# Patient Record
Sex: MALE | Race: WHITE
[De-identification: names, ages, dates, MRNs, and addresses within clinical notes are randomized per-mention and may not be internally consistent; named-entity substitution may affect disease eponyms.]

---

## 2018-01-24 ENCOUNTER — HOSPITAL ENCOUNTER (INPATIENT)
Dept: HOSPITAL 50 - VM.ED | Age: 83
LOS: 5 days | Discharge: HOME HEALTH | DRG: 871 | End: 2018-01-29
Attending: FAMILY MEDICINE | Admitting: FAMILY MEDICINE
Payer: MEDICARE

## 2018-01-24 DIAGNOSIS — Z87.891: ICD-10-CM

## 2018-01-24 DIAGNOSIS — M19.90: ICD-10-CM

## 2018-01-24 DIAGNOSIS — Z79.899: ICD-10-CM

## 2018-01-24 DIAGNOSIS — R09.02: ICD-10-CM

## 2018-01-24 DIAGNOSIS — H52.10: ICD-10-CM

## 2018-01-24 DIAGNOSIS — G89.29: ICD-10-CM

## 2018-01-24 DIAGNOSIS — F39: ICD-10-CM

## 2018-01-24 DIAGNOSIS — I71.4: ICD-10-CM

## 2018-01-24 DIAGNOSIS — B95.61: ICD-10-CM

## 2018-01-24 DIAGNOSIS — M54.9: ICD-10-CM

## 2018-01-24 DIAGNOSIS — R50.9: ICD-10-CM

## 2018-01-24 DIAGNOSIS — Z79.52: ICD-10-CM

## 2018-01-24 DIAGNOSIS — R05: ICD-10-CM

## 2018-01-24 DIAGNOSIS — I73.9: ICD-10-CM

## 2018-01-24 DIAGNOSIS — L02.211: ICD-10-CM

## 2018-01-24 DIAGNOSIS — Z79.82: ICD-10-CM

## 2018-01-24 DIAGNOSIS — A41.9: Primary | ICD-10-CM

## 2018-01-24 DIAGNOSIS — K43.2: ICD-10-CM

## 2018-01-24 DIAGNOSIS — H40.9: ICD-10-CM

## 2018-01-24 DIAGNOSIS — K43.9: ICD-10-CM

## 2018-01-24 DIAGNOSIS — E86.0: ICD-10-CM

## 2018-01-24 DIAGNOSIS — R65.20: ICD-10-CM

## 2018-01-24 DIAGNOSIS — R79.89: ICD-10-CM

## 2018-01-24 DIAGNOSIS — J44.1: ICD-10-CM

## 2018-01-24 DIAGNOSIS — N17.9: ICD-10-CM

## 2018-01-24 DIAGNOSIS — E78.5: ICD-10-CM

## 2018-01-24 DIAGNOSIS — Z85.51: ICD-10-CM

## 2018-01-24 DIAGNOSIS — Z85.118: ICD-10-CM

## 2018-01-24 DIAGNOSIS — J18.8: ICD-10-CM

## 2018-01-24 DIAGNOSIS — T81.4XXA: ICD-10-CM

## 2018-01-24 DIAGNOSIS — R06.02: ICD-10-CM

## 2018-01-24 LAB
CHLORIDE SERPL-SCNC: 96 MMOL/L (ref 98–107)
SODIUM SERPL-SCNC: 136 MMOL/L (ref 136–145)

## 2018-01-24 PROCEDURE — 94760 N-INVAS EAR/PLS OXIMETRY 1: CPT

## 2018-01-24 PROCEDURE — 83880 ASSAY OF NATRIURETIC PEPTIDE: CPT

## 2018-01-24 PROCEDURE — 87205 SMEAR GRAM STAIN: CPT

## 2018-01-24 PROCEDURE — 87070 CULTURE OTHR SPECIMN AEROBIC: CPT

## 2018-01-24 PROCEDURE — 83605 ASSAY OF LACTIC ACID: CPT

## 2018-01-24 PROCEDURE — 71045 X-RAY EXAM CHEST 1 VIEW: CPT

## 2018-01-24 PROCEDURE — 96375 TX/PRO/DX INJ NEW DRUG ADDON: CPT

## 2018-01-24 PROCEDURE — 87804 INFLUENZA ASSAY W/OPTIC: CPT

## 2018-01-24 PROCEDURE — 85025 COMPLETE CBC W/AUTO DIFF WBC: CPT

## 2018-01-24 PROCEDURE — 84484 ASSAY OF TROPONIN QUANT: CPT

## 2018-01-24 PROCEDURE — 74177 CT ABD & PELVIS W/CONTRAST: CPT

## 2018-01-24 PROCEDURE — 96365 THER/PROPH/DIAG IV INF INIT: CPT

## 2018-01-24 PROCEDURE — 86140 C-REACTIVE PROTEIN: CPT

## 2018-01-24 PROCEDURE — 85610 PROTHROMBIN TIME: CPT

## 2018-01-24 PROCEDURE — 87040 BLOOD CULTURE FOR BACTERIA: CPT

## 2018-01-24 PROCEDURE — 94640 AIRWAY INHALATION TREATMENT: CPT

## 2018-01-24 PROCEDURE — 99285 EMERGENCY DEPT VISIT HI MDM: CPT

## 2018-01-24 PROCEDURE — 36415 COLL VENOUS BLD VENIPUNCTURE: CPT

## 2018-01-24 PROCEDURE — 80053 COMPREHEN METABOLIC PANEL: CPT

## 2018-01-24 PROCEDURE — 83735 ASSAY OF MAGNESIUM: CPT

## 2018-01-24 PROCEDURE — 96361 HYDRATE IV INFUSION ADD-ON: CPT

## 2018-01-24 PROCEDURE — 93005 ELECTROCARDIOGRAM TRACING: CPT

## 2018-01-24 RX ADMIN — Medication PRN ML: at 20:13

## 2018-01-24 RX ADMIN — SODIUM CHLORIDE AND POTASSIUM CHLORIDE SCH MLS/HR: .9; .15 SOLUTION INTRAVENOUS at 18:45

## 2018-01-25 LAB
CHLORIDE SERPL-SCNC: 102 MMOL/L (ref 98–107)
SODIUM SERPL-SCNC: 137 MMOL/L (ref 136–145)

## 2018-01-25 RX ADMIN — TOLTERODINE TARTRATE SCH MG: 2 CAPSULE, EXTENDED RELEASE ORAL at 19:48

## 2018-01-25 RX ADMIN — TRIAMTERENE AND HYDROCHLOROTHIAZIDE SCH EACH: 37.5; 25 TABLET ORAL at 12:17

## 2018-01-25 RX ADMIN — SODIUM CHLORIDE AND POTASSIUM CHLORIDE SCH MLS/HR: .9; .15 SOLUTION INTRAVENOUS at 05:41

## 2018-01-25 RX ADMIN — SODIUM CHLORIDE AND POTASSIUM CHLORIDE SCH MLS/HR: .9; .15 SOLUTION INTRAVENOUS at 16:46

## 2018-01-25 NOTE — EDM.PDOC
ED HPI GENERAL MEDICAL PROBLEM





- General


Chief Complaint: Respiratory Problem


Time Seen by Provider: 01/24/18 14:05


Source of Information: Reports: Patient


History Limitations: Reports: No Limitations





- History of Present Illness


INITIAL COMMENTS - FREE TEXT/NARRATIVE: 





Pt. presents to the ER with cough, chest congestion, and shortness of breath. 

He has been experiencing fever and chills. Pt. states that he has been sick of 

over a week. 


He complains of myalgias and arthralgias as well. No nausea/vomiting/or 

diarrhea.


Location: Reports: Generalized


Associated Symptoms: Reports: Cough, cough w sputum, Shortness of Breath, 

Weakness





- Related Data


 Allergies











Allergy/AdvReac Type Severity Reaction Status Date / Time


 


No Known Allergies Allergy   Verified 01/24/18 15:20











Home Meds: 


 Home Meds





Ammonium Lactate [Lac-Hydrin 12% Crm] 1 applic TOP QID PRN 12/14/15 [History]


Triamcinolone Acetonide [Kenalog 0.1% Crm] 1 applic TOP BID 12/14/15 [History]


Triamterene/Hydrochlorothiazid [Triamterene-HCTZ 37.5-25 MG] 1 cap PO DAILY 12/

14/15 [History]


amLODIPine [Norvasc] 10 mg PO BEDTIME 12/14/15 [History]


Aspirin [Halfprin] 81 mg PO DAILY  tab.ec 12/17/15 [Rx]


Metoprolol Tartrate [Lopressor] 100 mg PO DAILY  tablet 12/17/15 [Rx]


Pantoprazole [ProTONIX***] 40 mg PO DAILY@0700  tab.cr 12/17/15 [Rx]


Tolterodine [Detrol LA 24 Hr] 4 mg PO BEDTIME #30 cap.er 12/17/15 [Rx]


Captopril [Capoten] 25 mg PO BIDMEALS 06/21/16 [History]


atorvaSTATin [Lipitor] 40 mg PO BEDTIME 06/21/16 [History]


DULoxetine [Cymbalta] 20 mg PO DAILY 01/24/18 [History]


Ibuprofen [Advil] 600 mg PO BEDTIME 01/24/18 [History]


Meloxicam [Mobic] 7.5 mg PO DAILY 01/24/18 [History]


Polyethylene Glycol 3350 [MiraLAX] 17 gm PO DAILY PRN 01/24/18 [History]


traMADol [Ultram] 50 - 100 mg PO Q6H PRN 01/24/18 [History]











Past Medical History


HEENT History: Reports: Cataract, Glaucoma, Impaired Vision


Other HEENT History: myopia


Cardiovascular History: Reports: Aneurysm, High Cholesterol, Hypertension, PVD


Other Cardiovascular History: arthrosclerosis, aflutter


Respiratory History: Reports: SOB


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


Gastrointestinal History: Reports: None


Other Gastrointestinal History: abdominal pain


Genitourinary History: Reports: Other (See Below)


Other Genitourinary History: urine cytology abnormalm, bladder tumor, hematuria

, neoplasm of bladder and prostate


Musculoskeletal History: Reports: Back Pain, Chronic, Osteoarthritis


Neurological History: Reports: None


Endocrine/Metabolic History: Reports: None


Dermatologic History: Reports: Other (See Below)


Other Dermatologic History: tinea corporis





- Infectious Disease History


Infectious Disease History: Reports: Chicken Pox, Measles, Mumps





- Past Surgical History


HEENT Surgical History: Reports: Cataract Surgery


Musculoskeletal Surgical History: Reports: Hip Replacement





Social & Family History





- Family History


Family Medical History: Noncontributory


Cardiac: Reports: MI


Neurological: Reports: Alzheimers Disease





- Tobacco Use


Smoking Status *Q: Current Every Day Smoker


Years of Tobacco use: 63


Packs/Tins Daily: 0.5


Second Hand Smoke Exposure: No





- Alcohol Use


Days Per Week of Alcohol Use: 7


Number of Drinks Per Day: 2


Total Drinks Per Week: 14





- Recreational Drug Use


Recreational Drug Use: No





ED ROS GENERAL





- Review of Systems


Review Of Systems: See Below


Constitutional: Reports: Fever, Chills, Malaise, Weakness, Fatigue


HEENT: Reports: Rhinitis, Sinus Problem


Respiratory: Reports: No Symptoms, Shortness of Breath, Pleuritic Chest Pain, 

Cough, Sputum


Cardiovascular: Reports: No Symptoms, Dyspnea on Exertion


Endocrine: Reports: No Symptoms


GI/Abdominal: Reports: No Symptoms


: Reports: No Symptoms


Musculoskeletal: Reports: Joint Pain, Muscle Pain


Skin: Reports: No Symptoms


Neurological: Reports: No Symptoms


Psychiatric: Reports: No Symptoms


Hematologic/Lymphatic: Reports: No Symptoms


Immunologic: Reports: No Symptoms





ED EXAM, GENERAL





- Physical Exam


Exam: See Below


Exam Limited By: No Limitations


General Appearance: Alert, WD/WN, No Apparent Distress


Nose: Normal Inspection, Normal Mucosa, No Blood


Throat/Mouth: Normal Inspection, Normal Lips, Normal Teeth, Normal Gums, Normal 

Oropharynx, Normal Voice, No Airway Compromise


Head: Atraumatic, Normocephalic


Neck: Normal Inspection, Supple, Non-Tender, Full Range of Motion


Respiratory/Chest: Respiratory Distress, Decreased Breath Sounds, Crackles, 

Wheezing


Cardiovascular: Normal Peripheral Pulses, No Edema, Tachycardia


Peripheral Pulses: 2+: Radial (L), Radial (R)


GI/Abdominal: Normal Bowel Sounds, Soft, Non-Tender, Hernia


 (Male) Exam: Deferred


Rectal (Males) Exam: Deferred


Back Exam: Normal Inspection, Full Range of Motion


Extremities: Normal Inspection, Normal Range of Motion, Non-Tender, No Pedal 

Edema, Normal Capillary Refill


Neurological: Alert, Oriented, CN II-XII Intact, Normal Cognition, Normal Gait, 

Normal Reflexes, No Motor/Sensory Deficits


Psychiatric: Normal Affect, Normal Mood


Skin Exam: Warm, Dry, Intact, Normal Color, No Rash


Lymphatic: No Adenopathy





EKG INTERPRETATION


Rhythm: NSR





Course





- Vital Signs


Last Recorded V/S: 





 Last Vital Signs











Temp  36.4 C   01/25/18 14:00


 


Pulse  62   01/25/18 14:00


 


Resp  24 H  01/25/18 14:00


 


BP  123/73   01/25/18 14:00


 


Pulse Ox  98   01/25/18 14:00














- Orders/Labs/Meds


Orders: 





 Active Orders 24 hr











 Category Date Time Status


 


 AEROBIC IDENT [MREF] Stat Lab  01/25/18 14:42 Received








 Medication Orders





Amlodipine Besylate (Norvasc)  10 mg PO BEDTIME [facility]


Aspirin (Halfprin)  81 mg PO DAILY [facility]


   Last Admin: 01/25/18 09:06  Dose: 81 mg


Atorvastatin Calcium (Lipitor)  40 mg PO BEDTIME [facility]


Captopril (Capoten)  25 mg PO BIDMEALS [facility]


   Last Admin: 01/25/18 09:06  Dose: 25 mg


Ceftriaxone Sodium (Rocephin)  1 gm IVPUSH DAILY [facility]


   Last Admin: 01/25/18 09:05  Dose: 1 gm


   Admin: 01/24/18 20:13  Dose: 1 gm


Duloxetine HCl (Cymbalta)  20 mg PO DAILY [facility]


   Last Admin: 01/25/18 09:06  Dose: 20 mg


Potassium Chloride/Sodium Chloride (Normal Saline With 20 Meq Kcl)  1,000 mls @ 

100 mls/hr IV ASDIRECTED [facility]


   Last Admin: 01/25/18 16:46  Dose: 150 mls/hr


   Infusion: 01/25/18 12:22  Dose: 150 mls/hr


   Admin: 01/25/18 05:41  Dose: 150 mls/hr


   Infusion: 01/25/18 01:26  Dose: 150 mls/hr


   Admin: 01/24/18 18:45  Dose: 150 mls/hr


Azithromycin 500 mg/ Sodium (Chloride)  250 mls @ 250 mls/hr IV DAILY [facility]


Ibuprofen (Motrin)  600 mg PO BEDTIME [facility]


Meloxicam (Mobic)  7.5 mg PO DAILY [facility]


   Last Admin: 01/25/18 09:06  Dose: 7.5 mg


Metoprolol Tartrate (Lopressor)  100 mg PO DAILY [facility]


   Last Admin: 01/25/18 09:06  Dose: 100 mg


Ammonium Lactate [ Lac-Hydrin 12% Crm] Own Supply  1 dose TOP QID PRN


   PRN Reason: Itching


Pantoprazole Sodium (Protonix***)  40 mg PO DAILY@0700 [facility]


   Last Admin: 01/25/18 06:19  Dose: 40 mg


   Admin: 01/25/18 05:41  Dose: 40 mg


Polyethylene Glycol (Miralax)  17 gm PO DAILY PRN


   PRN Reason: Constipation


Sodium Chloride (Saline Flush)  10 ml FLUSH ASDIRECTED PRN


   PRN Reason: Keep Vein Open


   Last Admin: 01/24/18 20:13  Dose: 10 ml


Tolterodine Tartrate (Detrol La 24 Hr)  4 mg PO BEDTIME [facility]


Tramadol HCl (Ultram)  50 - 100 mg PO Q6H PRN


   PRN Reason: Pain


Triamterene/HCTZ (Maxzide 25-37.5 Mg)  1 each PO DAILY [facility]


   Last Admin: 01/25/18 12:17  Dose: 1 each








Labs: 





 Laboratory Tests











  01/24/18 01/24/18 01/24/18 Range/Units





  14:38 14:45 14:45 


 


WBC   11.1 H   (4.0-10.0)  x10^3/uL


 


RBC   4.43 L   (4.5-6.0)  x10^6/uL


 


Hgb   14.7  D   (14.0-18.0)  g/dL


 


Hct   43.3   (40.0-52.0)  %


 


MCV   97.7 H   (78.0-93.0)  fL


 


MCH   33.2 H   (26.0-32.0)  pg


 


MCHC   33.9   (32.0-36.0)  g/dL


 


RDW Coeff of Sudhir   14.2   (10.0-15.0)  %


 


Plt Count   185  D   (130-400)  x10^3/uL


 


Add Manual Diff   Yes   


 


Neutrophils % (Manual)   71   (50-80)  %


 


Band Neutrophils %   3   (0-6)  %


 


Lymphocytes % (Manual)   21 L   (25-50)  %


 


Monocytes % (Manual)   5   (2-11)  %


 


Platelet Estimate   Adequate   


 


PT  10.7    (9.8-11.8)  SEC


 


INR  1.0 L    (2.0-3.5)  


 


Sodium    136  (136-145)  mmol/L


 


Potassium    3.7  (3.5-5.1)  mmol/L


 


Chloride    96 L  ()  mmol/L


 


Carbon Dioxide    27  (21-32)  mmol/L


 


BUN    26 H  (7-18)  mg/dL


 


Creatinine    1.6 H  (0.70-1.30)  mg/dL


 


Est Cr Clr Drug Dosing    38.57  mL/min


 


Estimated GFR (MDRD)    42  


 


Glucose    129 H  ()  mg/dL


 


Lactic Acid     (0.4-2.0)  mmol/L


 


Calcium    9.0  (8.5-10.1)  mg/dL


 


Corrected Calcium    9.64  (8.5-10.1)  mg/dL


 


Magnesium    1.3 L  (1.8-2.4)  mg/dL


 


Total Bilirubin    0.8  (0.2-1.0)  mg/dL


 


AST    26  (15-37)  U/L


 


ALT    16  (16-63)  U/L


 


Alkaline Phosphatase    76  ()  U/L


 


Troponin I    < 0.017  (<=0.056)  ng/mL


 


C-Reactive Protein    < 0.2  (<=0.9)  mg/dL


 


NT-Pro-B Natriuret Pep    2667 H  (<=450)  pg/mL


 


Total Protein    8.1  (6.4-8.2)  g/dL


 


Albumin    3.2 L  (3.4-5.0)  g/dL


 


Globulin    4.9  


 


Albumin/Globulin Ratio    0.65  














  01/24/18 Range/Units





  14:45 


 


WBC   (4.0-10.0)  x10^3/uL


 


RBC   (4.5-6.0)  x10^6/uL


 


Hgb   (14.0-18.0)  g/dL


 


Hct   (40.0-52.0)  %


 


MCV   (78.0-93.0)  fL


 


MCH   (26.0-32.0)  pg


 


MCHC   (32.0-36.0)  g/dL


 


RDW Coeff of Sudhir   (10.0-15.0)  %


 


Plt Count   (130-400)  x10^3/uL


 


Add Manual Diff   


 


Neutrophils % (Manual)   (50-80)  %


 


Band Neutrophils %   (0-6)  %


 


Lymphocytes % (Manual)   (25-50)  %


 


Monocytes % (Manual)   (2-11)  %


 


Platelet Estimate   


 


PT   (9.8-11.8)  SEC


 


INR   (2.0-3.5)  


 


Sodium   (136-145)  mmol/L


 


Potassium   (3.5-5.1)  mmol/L


 


Chloride   ()  mmol/L


 


Carbon Dioxide   (21-32)  mmol/L


 


BUN   (7-18)  mg/dL


 


Creatinine   (0.70-1.30)  mg/dL


 


Est Cr Clr Drug Dosing   mL/min


 


Estimated GFR (MDRD)   


 


Glucose   ()  mg/dL


 


Lactic Acid  2.1 H*  (0.4-2.0)  mmol/L


 


Calcium   (8.5-10.1)  mg/dL


 


Corrected Calcium   (8.5-10.1)  mg/dL


 


Magnesium   (1.8-2.4)  mg/dL


 


Total Bilirubin   (0.2-1.0)  mg/dL


 


AST   (15-37)  U/L


 


ALT   (16-63)  U/L


 


Alkaline Phosphatase   ()  U/L


 


Troponin I   (<=0.056)  ng/mL


 


C-Reactive Protein   (<=0.9)  mg/dL


 


NT-Pro-B Natriuret Pep   (<=450)  pg/mL


 


Total Protein   (6.4-8.2)  g/dL


 


Albumin   (3.4-5.0)  g/dL


 


Globulin   


 


Albumin/Globulin Ratio   











Meds: 





Medications











Generic Name Dose Route Start Last Admin





  Trade Name Freq  PRN Reason Stop Dose Admin


 


Amlodipine Besylate  10 mg  01/25/18 20:00  





  Norvasc  PO   





  BEDTIME [facility]   


 


Aspirin  81 mg  01/25/18 08:00  01/25/18 09:06





  Halfprin  PO   81 mg





  DAILY [facility]   Administration


 


Atorvastatin Calcium  40 mg  01/25/18 20:00  





  Lipitor  PO   





  BEDTIME [facility]   


 


Captopril  25 mg  01/25/18 08:00  01/25/18 09:06





  Capoten  PO   25 mg





  BIDMEALS [facility]O   Administration


 


Ceftriaxone Sodium  1 gm  01/24/18 21:00  01/25/18 09:05





  Rocephin  IVPUSH   1 gm





  DAILY [facility]   Administration


 


Duloxetine HCl  20 mg  01/25/18 08:00  01/25/18 09:06





  Cymbalta  PO   20 mg





  DAILY [facility]   Administration


 


Potassium Chloride/Sodium Chloride  1,000 mls @ 100 mls/hr  01/24/18 18:30  01/ 25/18 16:46





  Normal Saline With 20 Meq Kcl  IV   150 mls/hr





  ASDIRECTED [facility]   Administration


 


Azithromycin 500 mg/ Sodium  250 mls @ 250 mls/hr  01/26/18 08:00  





  Chloride  IV   





  DAILY [facility]   


 


Ibuprofen  600 mg  01/25/18 20:00  





  Motrin  PO   





  BEDTIME [facility]   


 


Meloxicam  7.5 mg  01/25/18 08:00  01/25/18 09:06





  Mobic  PO   7.5 mg





  DAILY [facility]   Administration


 


Metoprolol Tartrate  100 mg  01/25/18 08:00  01/25/18 09:06





  Lopressor  PO   100 mg





  DAILY [facility]   Administration


 


Ammonium Lactate [  1 dose  01/25/18 08:50  





Lac-Hydrin 12% Crm]  TOP   





Own Supply  QID PRN   





  Itching   


 


Pantoprazole Sodium  40 mg  01/25/18 07:00  01/25/18 06:19





  Protonix***  PO   40 mg





  DAILY@0700 [facility]   Administration


 


Polyethylene Glycol  17 gm  01/25/18 08:50  





  Miralax  PO   





  DAILY PRN   





  Constipation   


 


Sodium Chloride  10 ml  01/24/18 14:10  01/24/18 20:13





  Saline Flush  FLUSH   10 ml





  ASDIRECTED PRN   Administration





  Keep Vein Open   


 


Tolterodine Tartrate  4 mg  01/25/18 20:00  





  Detrol La 24 Hr  PO   





  BEDTIME [facility]   


 


Tramadol HCl  50 - 100 mg  01/24/18 21:48  





  Ultram  PO   





  Q6H PRN   





  Pain   


 


Triamterene/HCTZ  1 each  01/25/18 09:30  01/25/18 12:17





  Maxzide 25-37.5 Mg  PO   1 each





  DAILY [facility]   Administration














Discontinued Medications














Generic Name Dose Route Start Last Admin





  Trade Name Freq  PRN Reason Stop Dose Admin


 


Albuterol/Ipratropium  3 ml  01/24/18 14:13  01/24/18 14:16





  Duoneb 3.0-0.5 Mg/3 Ml  NEB  01/24/18 14:14  3 ml





  ONETIME ONE   Administration


 


Sodium Chloride  1,000 mls @ 125 mls/hr  01/24/18 14:12  01/24/18 14:38





  Normal Saline  IV  01/24/18 22:11  125 mls/hr





  .BOLUS ONE   Administration


 


Piperacillin Sod/Tazobactam  100 mls @ 200 mls/hr  01/24/18 14:55  01/24/18 15:

05





  Sod 4.5 gm/ Sodium Chloride  IV  01/24/18 15:24  200 mls/hr





  ONETIME ONE   Administration


 


Sodium Chloride  500 mls @ 1,000 mls/hr  01/24/18 16:42  01/24/18 22:21





  Normal Saline  IV  01/24/18 17:11  Not Given





  .BOLUS ONE   


 


Azithromycin 500 mg/ Sodium  250 mls @ 250 mls/hr  01/24/18 18:30  01/25/18 09:

05





  Chloride  IV   250 mls/hr





  DAILY [facility]   Administration


 


Iopamidol  100 ml  01/24/18 16:24  01/24/18 17:02





  Isovue-300 (61%)  IVPUSH  01/24/18 16:25  100 ml





  ONETIME ONE   Administration


 


Methylprednisolone Sodium Succinate  125 mg  01/24/18 14:13  01/24/18 14:50





  Solu-Medrol  IV  01/24/18 14:14  125 mg





  ONETIME ONE   Administration


 


Triamcinolone Acetonide  0 gm  01/25/18 09:30  01/25/18 12:14





  Triamcinolone Acetonide 0.1% Crm  TOP   Not Given





  BID [facility]   














Departure





- Departure


Time of Disposition: 18:10


Disposition: Admitted As Inpatient 67


Clinical Impression: 


 Pneumonia, Influenza A, CAP (community acquired pneumonia)








- Discharge Information





- My Orders


Last 24 Hours: 





My Active Orders





01/25/18 14:42


AEROBIC IDENT [MREF] Stat 














- Assessment/Plan


Last 24 Hours: 





My Active Orders





01/25/18 14:42


AEROBIC IDENT [MREF] Stat

## 2018-01-25 NOTE — PCM.HP
H&P History of Present Illness





- General


Date of Service: 01/25/18


Admit Problem/Dx: 








Admitted yesterday from the ER by provider there for cough dyspnea or fever.  

He had been feeling ill for about five days prior.  Does describe some flulike 

symptoms of fever chills myalgias.  Rapid flu antigen testing was negative in 

the ER.  Started on Rocephin and Zithromax.  He is feeling better today.





Overnight his fever has resolved he is requiring less supplemental oxygen.





Source of Information: Patient





- Related Data


Allergies/Adverse Reactions: 


 Allergies











Allergy/AdvReac Type Severity Reaction Status Date / Time


 


No Known Allergies Allergy   Verified 01/24/18 15:20











Home Medications: 


 Home Meds





Ammonium Lactate [Lac-Hydrin 12% Crm] 1 applic TOP QID PRN 12/14/15 [History]


Triamcinolone Acetonide [Kenalog 0.1% Crm] 1 applic TOP BID 12/14/15 [History]


Triamterene/Hydrochlorothiazid [Triamterene-HCTZ 37.5-25 MG] 1 cap PO DAILY 12/

14/15 [History]


amLODIPine [Norvasc] 10 mg PO BEDTIME 12/14/15 [History]


Aspirin [Halfprin] 81 mg PO DAILY  tab.ec 12/17/15 [Rx]


Metoprolol Tartrate [Lopressor] 100 mg PO DAILY  tablet 12/17/15 [Rx]


Pantoprazole [ProTONIX***] 40 mg PO DAILY@0700  tab.cr 12/17/15 [Rx]


Tolterodine [Detrol LA 24 Hr] 4 mg PO BEDTIME #30 cap.er 12/17/15 [Rx]


Captopril [Capoten] 25 mg PO BIDMEALS 06/21/16 [History]


atorvaSTATin [Lipitor] 40 mg PO BEDTIME 06/21/16 [History]


DULoxetine [Cymbalta] 20 mg PO DAILY 01/24/18 [History]


Ibuprofen [Advil] 600 mg PO BEDTIME 01/24/18 [History]


Meloxicam [Mobic] 7.5 mg PO DAILY 01/24/18 [History]


Polyethylene Glycol 3350 [MiraLAX] 17 gm PO DAILY PRN 01/24/18 [History]


traMADol [Ultram] 50 - 100 mg PO Q6H PRN 01/24/18 [History]











Past Medical History


HEENT History: Reports: Cataract, Glaucoma, Impaired Vision


Other HEENT History: myopia


Cardiovascular History: Reports: Aneurysm, High Cholesterol, Hypertension, PVD


Other Cardiovascular History: arthrosclerosis, aflutter


Respiratory History: Reports: SOB


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


Gastrointestinal History: Reports: None


Other Gastrointestinal History: abdominal pain


Genitourinary History: Reports: Other (See Below)


Other Genitourinary History: urine cytology abnormalm, bladder tumor, hematuria

, neoplasm of bladder and prostate


Musculoskeletal History: Reports: Back Pain, Chronic, Osteoarthritis


Neurological History: Reports: None


Endocrine/Metabolic History: Reports: None


Dermatologic History: Reports: Other (See Below)


Other Dermatologic History: tinea corporis





- Infectious Disease History


Infectious Disease History: Reports: Chicken Pox, Measles, Mumps





- Past Surgical History


HEENT Surgical History: Reports: Cataract Surgery


Musculoskeletal Surgical History: Reports: Hip Replacement





Social & Family History





- Family History


Family Medical History: Noncontributory


Cardiac: Reports: MI


Neurological: Reports: Alzheimers Disease





- Tobacco Use


Smoking Status *Q: Current Every Day Smoker


Years of Tobacco use: 63


Packs/Tins Daily: 0.5


Second Hand Smoke Exposure: No





- Alcohol Use


Days Per Week of Alcohol Use: 7


Number of Drinks Per Day: 2


Total Drinks Per Week: 14





- Recreational Drug Use


Recreational Drug Use: No





H&P Review of Systems





- Review of Systems:


Review Of Systems: See Below


General: Reports: Fever, Chills, Malaise, Weakness


Pulmonary: Reports: Shortness of Breath, Cough


Cardiovascular: Denies: Chest Pain


Gastrointestinal: Denies: Abdominal Pain, Vomiting


Musculoskeletal: Reports: Muscle Pain





Exam





- Exam


Exam: See Below





- Vital Signs


Vital Signs: 


 Last Vital Signs











Temp  35.9 C   01/25/18 10:00


 


Pulse  57 L  01/25/18 10:00


 


Resp  28 H  01/25/18 10:00


 


BP  116/70   01/25/18 12:13


 


Pulse Ox  97   01/25/18 10:00











Weight: 77.111 kg





- Exam


Quality Assessment: Supplemental Oxygen


General: Alert, Oriented, Cooperative.  No: Mild Distress


Lungs: Clear to Auscultation, Normal Respiratory Effort, Decreased Breath Sounds


Cardiovascular: Regular Rate, Regular Rhythm


GI/Abdominal Exam: Normal Bowel Sounds, Soft, Non-Tender, Hernia





- Patient Data


Lab Results Last 24 hrs: 


 Laboratory Results - last 24 hr











  01/24/18 01/24/18 01/24/18 Range/Units





  22:00 22:00 23:59 


 


WBC     (4.0-10.0)  x10^3/uL


 


RBC     (4.5-6.0)  x10^6/uL


 


Hgb     (14.0-18.0)  g/dL


 


Hct     (40.0-52.0)  %


 


MCV     (78.0-93.0)  fL


 


MCH     (26.0-32.0)  pg


 


MCHC     (32.0-36.0)  g/dL


 


RDW Coeff of Sudhir     (10.0-15.0)  %


 


Plt Count     (130-400)  x10^3/uL


 


Neut % (Auto)     (50.0-80.0)  %


 


Lymph % (Auto)     (25.0-50.0)  %


 


Mono % (Auto)     (2.0-11.0)  %


 


Eos % (Auto)     (0.0-4.0)  %


 


Baso % (Auto)     (0.2-1.2)  %


 


Sodium     (136-145)  mmol/L


 


Potassium     (3.5-5.1)  mmol/L


 


Chloride     ()  mmol/L


 


Carbon Dioxide     (21-32)  mmol/L


 


BUN     (7-18)  mg/dL


 


Creatinine     (0.70-1.30)  mg/dL


 


Est Cr Clr Drug Dosing     mL/min


 


Estimated GFR (MDRD)     


 


Glucose     ()  mg/dL


 


Lactic Acid  1.8    (0.4-2.0)  mmol/L


 


Calcium     (8.5-10.1)  mg/dL


 


Troponin I   < 0.017   (<=0.056)  ng/mL


 


C-Reactive Protein     (<=0.9)  mg/dL


 


Urine Color    Dark yellow H  (YELLOW)  


 


Urine Appearance    Cloudy H  (CLEAR)  


 


Urine pH    6.0  (5.0-8.0)  


 


Ur Specific Gravity    1.010  


 


Urine Protein    30 H  (NEGATIVE)  mg/dL


 


Urine Glucose (UA)    Negative  (NEGATIVE)  mg/dL


 


Urine Ketones    Negative  (NEGATIVE)  mg/dL


 


Urine Occult Blood    Moderate H  (NEGATIVE)  


 


Urine Nitrite    Negative  (NEGATIVE)  


 


Urine Bilirubin    Negative  (NEGATIVE)  


 


Urine Urobilinogen    2.0 H  (0.2)  EU/dL


 


Ur Leukocyte Esterase    Negative  (NEGATIVE)  


 


Urine RBC    5-10 H  (NOT SEEN)  /HPF


 


Urine WBC    0-5  (NOT SEEN)  /HPF


 


Ur Squamous Epith Cells    Not seen  (NEGATIVE)  /HPF


 


Amorphous Sediment    Moderate  


 


Urine Bacteria    Few H  (NEGATIVE)  /HPF


 


Urine Mucus    Few H  (NEGATIVE)  /LPF














  01/25/18 01/25/18 Range/Units





  09:24 09:24 


 


WBC  9.5   (4.0-10.0)  x10^3/uL


 


RBC  4.06 L   (4.5-6.0)  x10^6/uL


 


Hgb  13.4 L   (14.0-18.0)  g/dL


 


Hct  40.2   (40.0-52.0)  %


 


MCV  99.0 H   (78.0-93.0)  fL


 


MCH  33.0 H   (26.0-32.0)  pg


 


MCHC  33.3   (32.0-36.0)  g/dL


 


RDW Coeff of Sudhir  14.1   (10.0-15.0)  %


 


Plt Count  177   (130-400)  x10^3/uL


 


Neut % (Auto)  85.8 H   (50.0-80.0)  %


 


Lymph % (Auto)  9.9 L   (25.0-50.0)  %


 


Mono % (Auto)  4.2   (2.0-11.0)  %


 


Eos % (Auto)  0.0   (0.0-4.0)  %


 


Baso % (Auto)  0.1 L   (0.2-1.2)  %


 


Sodium   137  (136-145)  mmol/L


 


Potassium   3.7  (3.5-5.1)  mmol/L


 


Chloride   102  ()  mmol/L


 


Carbon Dioxide   27  (21-32)  mmol/L


 


BUN   36 H  (7-18)  mg/dL


 


Creatinine   1.5 H  (0.70-1.30)  mg/dL


 


Est Cr Clr Drug Dosing   41.14  mL/min


 


Estimated GFR (MDRD)   45  


 


Glucose   244 H  ()  mg/dL


 


Lactic Acid    (0.4-2.0)  mmol/L


 


Calcium   8.3 L  (8.5-10.1)  mg/dL


 


Troponin I    (<=0.056)  ng/mL


 


C-Reactive Protein   5.9 H  (<=0.9)  mg/dL


 


Urine Color    (YELLOW)  


 


Urine Appearance    (CLEAR)  


 


Urine pH    (5.0-8.0)  


 


Ur Specific Gravity    


 


Urine Protein    (NEGATIVE)  mg/dL


 


Urine Glucose (UA)    (NEGATIVE)  mg/dL


 


Urine Ketones    (NEGATIVE)  mg/dL


 


Urine Occult Blood    (NEGATIVE)  


 


Urine Nitrite    (NEGATIVE)  


 


Urine Bilirubin    (NEGATIVE)  


 


Urine Urobilinogen    (0.2)  EU/dL


 


Ur Leukocyte Esterase    (NEGATIVE)  


 


Urine RBC    (NOT SEEN)  /HPF


 


Urine WBC    (NOT SEEN)  /HPF


 


Ur Squamous Epith Cells    (NEGATIVE)  /HPF


 


Amorphous Sediment    


 


Urine Bacteria    (NEGATIVE)  /HPF


 


Urine Mucus    (NEGATIVE)  /LPF











Result Diagrams: 


 01/25/18 09:24





 01/25/18 09:24





*Q Meaningful Use (ADM)





- VTE *Q


VTE Criteria *Q: 








- Stroke *Q


Stroke Criteria *Q: 








- AMI *Q


AMI Criteria *Q: 





Problem List Initiated/Reviewed/Updated: Yes


Orders Last 24hrs: 


 Active Orders 24 hr











 Category Date Time Status


 


 Intake and Output [RC] 06,18 Care  01/24/18 18:08 Active


 


 Oxygen Therapy [RC] PRN Care  01/24/18 18:08 Active


 


 Up With Assistance [RC] ASDIRECTED Care  01/24/18 18:08 Active


 


 VTE/DVT Education [RC] .PRN Care  01/24/18 18:08 Active


 


 Vital Signs [RC] 06,10,14,18,22,02 Care  01/24/18 18:08 Active


 


 Regular Diet [DIET] Diet  01/25/18 Breakfast Active


 


 Ammonium Lactate [Lac-Hydrin 12% Crm] Med  01/25/18 08:50 Active





 1 dose TOP QID PRN   


 


 Aspirin [Halfprin] Med  01/25/18 08:00 Active





 81 mg PO DAILY   


 


 Azithromycin [Zithromax] 500 mg Med  01/26/18 08:00 Active





 Sodium Chloride 0.9% [Normal Saline] 250 ml   





 IV DAILY   


 


 Captopril [Capoten] Med  01/25/18 08:00 Active





 25 mg PO BIDMEALS   


 


 DULoxetine [Cymbalta] Med  01/25/18 08:00 Active





 20 mg PO DAILY   


 


 HCTZ/Triamterene [Maxzide 25-37.5 MG] Med  01/25/18 09:30 Active





 1 each PO DAILY   


 


 Ibuprofen [Motrin] Med  01/25/18 20:00 Active





 600 mg PO BEDTIME   


 


 Meloxicam [Mobic] Med  01/25/18 08:00 Active





 7.5 mg PO DAILY   


 


 Metoprolol Tartrate [Lopressor] Med  01/25/18 08:00 Active





 100 mg PO DAILY   


 


 NS + KCl 20mEq/L [Normal Saline with 20 mEq KCl] 1,000 Med  01/24/18 18:30 

Active





 ml   





 IV ASDIRECTED   


 


 Pantoprazole [ProTONIX***] Med  01/25/18 07:00 Active





 40 mg PO DAILY@0700   


 


 Polyethylene Glycol 3350 [MiraLAX] Med  01/25/18 08:50 Active





 17 gm PO DAILY PRN   


 


 Tolterodine [Detrol LA 24 Hr] Med  01/25/18 20:00 Active





 4 mg PO BEDTIME   


 


 amLODIPine [Norvasc] Med  01/25/18 20:00 Active





 10 mg PO BEDTIME   


 


 atorvaSTATin [Lipitor] Med  01/25/18 20:00 Active





 40 mg PO BEDTIME   


 


 cefTRIAXone [Rocephin] Med  01/24/18 21:00 Active





 1 gm IVPUSH DAILY   


 


 traMADol [Ultram] Med  01/24/18 21:48 Active





 50 - 100 mg PO Q6H PRN   


 


 Code Status [Resuscitation Status] Routine Resus Stat  01/24/18 21:43 Ordered








 Medication Orders





Amlodipine Besylate (Norvasc)  10 mg PO BEDTIME [facility]


Aspirin (Halfprin)  81 mg PO DAILY [facility]


   Last Admin: 01/25/18 09:06  Dose: 81 mg


Atorvastatin Calcium (Lipitor)  40 mg PO BEDTIME [facility]


Captopril (Capoten)  25 mg PO BIDMEALS [facility]


   Last Admin: 01/25/18 09:06  Dose: 25 mg


Ceftriaxone Sodium (Rocephin)  1 gm IVPUSH DAILY [facility]


   Last Admin: 01/25/18 09:05  Dose: 1 gm


   Admin: 01/24/18 20:13  Dose: 1 gm


Duloxetine HCl (Cymbalta)  20 mg PO DAILY [facility]


   Last Admin: 01/25/18 09:06  Dose: 20 mg


Potassium Chloride/Sodium Chloride (Normal Saline With 20 Meq Kcl)  1,000 mls @ 

100 mls/hr IV ASDIRECTED [facility]


   Last Admin: 01/25/18 05:41  Dose: 150 mls/hr


   Infusion: 01/25/18 01:26  Dose: 150 mls/hr


   Admin: 01/24/18 18:45  Dose: 150 mls/hr


Azithromycin 500 mg/ Sodium (Chloride)  250 mls @ 250 mls/hr IV DAILY [facility]


Ibuprofen (Motrin)  600 mg PO BEDTIME [facility]


Meloxicam (Mobic)  7.5 mg PO DAILY [facility]


   Last Admin: 01/25/18 09:06  Dose: 7.5 mg


Metoprolol Tartrate (Lopressor)  100 mg PO DAILY [facility]


   Last Admin: 01/25/18 09:06  Dose: 100 mg


Ammonium Lactate [ Lac-Hydrin 12% Crm] Own Supply  1 dose TOP QID PRN


   PRN Reason: Itching


Pantoprazole Sodium (Protonix***)  40 mg PO DAILY@0700 [facility]


   Last Admin: 01/25/18 06:19  Dose: 40 mg


   Admin: 01/25/18 05:41  Dose: 40 mg


Polyethylene Glycol (Miralax)  17 gm PO DAILY PRN


   PRN Reason: Constipation


Sodium Chloride (Saline Flush)  10 ml FLUSH ASDIRECTED PRN


   PRN Reason: Keep Vein Open


   Last Admin: 01/24/18 20:13  Dose: 10 ml


Tolterodine Tartrate (Detrol La 24 Hr)  4 mg PO BEDTIME [facility]


Tramadol HCl (Ultram)  50 - 100 mg PO Q6H PRN


   PRN Reason: Pain


Triamterene/HCTZ (Maxzide 25-37.5 Mg)  1 each PO DAILY [facility]


   Last Admin: 01/25/18 12:17  Dose: 1 each








Assessment/Plan Comment:: 








#1.  Possible community-acquired pneumonia.  Small infiltrate right side by CT 

and x-ray.  Viral, suspect preceding influenza versus secondary bacterial 

infection.  Improved on Rocephin and Zithromax.  Continue.  Continue to wean 

oxygen.





#2.  Given improving already I don't feel further DVT prophylaxis, nebulization 

or steroids are indicated.  He probably has some degree of chronic lung disease 

from his smoking history but no documented FEV1 or PFTs on chart.





#3.  Acute kidney injury.  Secondary to dehydration from above.  Slowly 

improving.  Gradual fluids 100 mL per hour given probably some cardiac chronic 

dysfunction.  Ad shi. diet.  Recheck chemistry in a.m.





#4.  Incidental recurrence of AAA at close to 5.5 cm per radiology report.  

Images have not been pushed and my system yet.  Once they are I will look at 

them myself review with IR.  They have been following him every 6 months 

because he is a known vasculopath with TAA graft a couple years ago and 

emergent open regular surgical repair of AAA about 12 years ago.  He will 

probably need near term reexamination of this AAA for any expansion and then to 

review with IR again next 3-6 months.





#5.  Large ventral hernia which does cause some occasional skin breakdown.  

Surgeon has seen this and visit with him.  They feel this to be a difficult 

repair and he would be a high-risk surgical candidate.  If he doesn't up 

undergoing AAA repair could possibly addresses the same time but I think any 

AAA repair would probably be endovascular not open at this point.

## 2018-01-26 LAB
CHLORIDE SERPL-SCNC: 102 MMOL/L (ref 98–107)
SODIUM SERPL-SCNC: 138 MMOL/L (ref 136–145)

## 2018-01-26 RX ADMIN — TOLTERODINE TARTRATE SCH MG: 2 CAPSULE, EXTENDED RELEASE ORAL at 20:01

## 2018-01-26 RX ADMIN — TRIAMTERENE AND HYDROCHLOROTHIAZIDE SCH EACH: 37.5; 25 TABLET ORAL at 09:17

## 2018-01-26 NOTE — PN
Progress Note for JACQUIE ESCOBAR  Date:  01/26/2018  Room #:  [facility]

 

HISTORY OF PRESENT ILLNESS:  Hospital day #3 for an 82-year-old admitted with a

week of cough and fever.  He was found to have a right lower lobe pneumonia,

started on IV Rocephin and Zithromax.  He has been improving with no fever, but

he still feels short of breath.  He is coughing up green sputum.  His temp on

admission was 101.3, but that was his last fever, now over 36 hours ago.  His

white count has also normalized, but he is still requiring oxygen.  He is in the

high 90s on 2 L, but without it he is below 90%.  Otherwise, nurse felt his

blood pressure had been lower but he got some IV fluids.  He was dehydrated.

His creatinine was up to 1.6 when he came in, but it normalized today.  Fluids

did get stopped last night as he had some increased trouble breathing.  He has

no history of heart failure.  Otherwise, he does live alone.  He still smokes.

He has had previous lung problems in the past with pneumonias.  His influenza

test was negative.

 

PHYSICAL EXAMINATION:

VITAL SIGNS:  Temperature 98.2, pulse 60, blood pressure 146/77, respiratory

rate 19, and O2 of 97% on 2 L.

GENERAL:  He is in no acute distress.

HEART:  Regular rate and rhythm without murmur.

LUNGS:  Sound show slight decreased throughout with some expiratory wheezing.

He has significantly decreased breath sounds over the right base, but no

crackles noted there.

ABDOMEN:  Nontender.

EXTREMITIES:  Warm and dry.  No edema.  No calf tenderness.

MENTAL STATUS:  He is alert and orientated x3.

PSYCHIATRIC:  He is not depressed or anxious.

 

LABORATORY DATA:  Lab work does show white count 9, hemoglobin 13.5, stable,

platelets 176.  Sodium 138, potassium 3.9, chloride 102, bicarb 28, BUN 33,

creatinine 1.1, glucose 107, calcium 8.6.  ProBNP 2895.  It was 2667 on

admission.  He did have 5-10 RBCs on his urine.

 

ASSESSMENT:

1. Sepsis secondary to community-acquired pneumonia, right lower lobe.  Lactic

    acid normalized on 01/24.  Overall, he is improving.

2. Community-acquired pneumonia.  We will continue IV Rocephin.  We will

    switch azithromycin over to oral.

3. Acute renal failure related to dehydration from pneumonia, resolved.  His

    IV fluids have been stopped, not going to repeat a creatinine tomorrow.  If

    he is stable, he will go home.  If he stays, he could have further testing

    done.

4. Mild hypoxia and likely underlying chronic obstructive pulmonary disease

    with exacerbation.  I am going to schedule some nebulizers today and get

    him on incentive spirometry and some prednisone 20 mg daily.

5. Elevated proBNP, probably due to renal insufficiency.  I do not feel there

    is any overt heart failure.  His chest x-ray today does not show that.

6. Known abdominal aortic aneurysm with reported reoccurrence.  Dr. Lionel Ocampo was going to be contacting IR regarding this.  He is not having

    any back or abdominal pain at this point to suggest that it needs immediate

    workup.

7. Ventral hernia, currently asymptomatic.  He can follow up outpatient.  He

    is a high risk surgical candidate per his primary care.

8. Essential hypertension under fair control.  We will leave medications the

    same.  If there was any fluid overload, I would switch him over to Lasix.

9. Mood disorder and chronic pain.  He is on Cymbalta.  I am going to change

    his Mobic to p.r.n. although his renal function did improve.  I will stop

    the extra p.r.n. ibuprofen.  He also has tramadol ordered.

10.Deep vein thrombosis prophylaxis.  I am starting Lovenox today.

 

PLAN:  The patient will continue acute cares.  We will get him on scheduled

nebs, some oral prednisone.  I will continue IV Rocephin but do oral Zithromax.

Plan to treat for a 5-day course at least for pneumonia.  We will get him up and

working with therapies.  Anticipate that if he can be weaned off oxygen, he will

be stable for discharge tomorrow.

 

 

ISAAKA:  01/26/2018 09:44:10  MODL:  01/26/2018 11:02:07

Job #:  [id number]

## 2018-01-27 RX ADMIN — Medication PRN ML: at 20:07

## 2018-01-27 RX ADMIN — TOLTERODINE TARTRATE SCH MG: 2 CAPSULE, EXTENDED RELEASE ORAL at 20:10

## 2018-01-27 RX ADMIN — TRIAMTERENE AND HYDROCHLOROTHIAZIDE SCH EACH: 37.5; 25 TABLET ORAL at 09:01

## 2018-01-27 RX ADMIN — Medication PRN ML: at 09:05

## 2018-01-27 NOTE — PN
Progress Note for JACQUIE ESCOBAR  Date:  01/27/2018  Room #:  VM.204

 

SUBJECTIVE:  An 82-year-old white male admitted on 01/25/2018 with right lower

lobe pneumonia.  He has been treated with IV Rocephin and now oral Zithromax.

He was started on nebulizers and prednisone yesterday.  He continues to have O2

requirement.  His O2 is currently down to 1 L, but apparently if they reduced it

further, he dropped his O2 saturations below 90.

 

He is feeling well.  He still has a cough productive of some greenish sputum.

Denies chest pain or shortness of breath.

 

OBJECTIVE:  General:  He is alert.  He is afebrile.  Pulse of 90, blood pressure

is 129/77, respirations are 16, O2 saturations currently 100 on 1 L.

Heart:  Regular rate and rhythm.

Lungs:  Clear with some decreased breath sounds in the right base.

Abdomen: Soft.  There is a ventral hernia.

Extremities:  No edema.

Skin:  Dry.

 

LABORATORY:  Reviewed.  There is no new laboratory done today.

 

Blood cultures are no growth.  Influenza tests both negative.

 

ASSESSMENT:

1. Community-acquired right lower lobe pneumonia-improving.

2. Hypoxia with continued O2 requirement.

3. Probable chronic obstructive pulmonary disease.

 

PLAN:

1. Continue IV Rocephin and Zithromax.  Continue O2.  Attempt to wean off

    today.

2. Ambulate at least b.i.d.  Continue to monitor his condition.

 

 

FM:  01/27/2018 12:08:11  MODL:  01/27/2018 15:15:22

Job #:  [id number]

## 2018-01-28 RX ADMIN — TRIAMTERENE AND HYDROCHLOROTHIAZIDE SCH EACH: 37.5; 25 TABLET ORAL at 08:56

## 2018-01-28 RX ADMIN — TOLTERODINE TARTRATE SCH MG: 2 CAPSULE, EXTENDED RELEASE ORAL at 20:04

## 2018-01-28 NOTE — PN
Progress Note for JACQUIE ESCOBAR  Date:  01/28/2018  Room #:  VM.204

 

SUBJECTIVE:  An 82-year-old white male admitted on 01/25/2018 with right lower

lobe pneumonia-community acquired.  He had a persistent O2 requirement with

that.  We were able to wean him off his O2 yesterday, last night.  He has not

been ambulating much, we need do that.

 

He denies any chest pain or shortness of breath at this time and has minimal if

any cough.

 

Nursing staff has noted a stitch coming through his abdomen to the right of his

midline incision just above the umbilicus.  Most likely from the site of the

laparoscopic procedure.

 

OBJECTIVE:  General:  He is alert.

Vital Signs:  He is afebrile.  Pulse is in the 80s, blood pressure is 148/96,

respirations are 20, and O2 saturations 96% on room air.

Heart:  Regular rate and rhythm.

Lungs:  Clear to auscultation.

Abdomen:  Soft.  There is a ventral hernia.  There is a single thread of a

stitch coming through a stitch abscess area to the right of his midline incision

just above the umbilicus.  I did grasp the stitch, but was unable to find the

knot.  It was blue-colored stitch and that left in place.  This area below that

was open.  Healing area presumes to stitch abscess also, however, no stitches

present at this time.

 

ASSESSMENT:

1. Community-acquired right lower lobe pneumonia-improving.

2. History of hypoxia, currently off O2.

3. Probable chronic obstructive pulmonary disease.

4. Stitch abscess on abdomen.

 

PLAN:

1. Ambulate today and monitor his O2 saturations.  If he does well, could be

    discharged tomorrow.

2. Bacitracin ointment applied b.i.d. to stitch abscess and monitor.

3. Dr. Lionel Ocampo, his primary provider will assume care in the morning.

 

 

FM:  01/28/2018 10:40:23  MODL:  01/28/2018 11:05:55

Job #:  [id number]

WALKER

## 2018-01-29 VITALS — SYSTOLIC BLOOD PRESSURE: 102 MMHG | DIASTOLIC BLOOD PRESSURE: 66 MMHG

## 2018-01-29 RX ADMIN — TRIAMTERENE AND HYDROCHLOROTHIAZIDE SCH EACH: 37.5; 25 TABLET ORAL at 09:13

## 2018-01-30 NOTE — DISCH
PRIMARY DISCHARGE DIAGNOSES:

1. Right lower lobe pneumonia.

2. Sepsis secondary to a right lower lobe pneumonia, community-acquired.  No

    sputum cultures were obtained.  Blood cultures were negative.

3. Acute renal failure secondary to dehydration and pneumonia, resolved with

    IV fluids.  His creatinine on discharge was 1.1 on 01/26 when it was last

    checked.

4. Likely chronic obstructive pulmonary disease with active smoking and

    exacerbation resulting in mild hypoxia resolved.  He was saturating 98% up

    and walking on room air on discharge.

5. Elevated proBNP with no documented history of heart failure, possibly due

    to renal insufficiency.  He was having no symptoms of heart failure on

    discharge.

6. Known abdominal aortic aneurysm with reoccurrence.  He is following up with

    Interventional Radiology as an outpatient for this.  He has no abdominal or

    back pain.

7. Ventral hernia which is recurrent with the potential for an enterocutaneous

    fistula.  He has been evaluated by General Surgery earlier this month.  It

    was felt that he would require a big operation.  He was being covered

    locally with some bacitracin.  He did have a wound culture done during his

    stay which did grow Staph aureus pansensitive.  He was not discharged on

    any antibiotics as he did complete Rocephin for at least 5 days during his

    stay and azithromycin for pneumonia and he was not having any surrounding

    cellulitis, but certainly one would restart antibiotics if there was any

    concern for an infection in his abdominal wall.  He would also follow up

    with Surgery if there is any plan for surgery.  There was a slight stitch

    sticking out as well which was just trimmed as it was connected.

8. Mood disorder and chronic pain.  He is on Cymbalta.  He did not use any

    tramadol or Mobic during his stay after I changed it to p.r.n. due to renal

    failure.

9. Essential hypertension controlled, on home medications.

10.Deep vein thrombosis prophylaxis.  He was given Lovenox.

 

REASON FOR ADMISSION:  On the date of admission, this 82-year-old male was

having a cough and fever.  He had a CT scan done, which did show the right lower

lobe pneumonia.  He was given IV Rocephin and Zithromax.  He continued on IV

Rocephin throughout his stay, but was switched over to oral Zithromax and was

tolerating it.  He had a temp of 101.3 on admission, but was afebrile after

about the first 24 hours and had no further fevers.  He was needing oxygen 2 L

to saturate above 90%, but this was gradually weaned off and he did well.  He

also had negative influenza testing.  Otherwise, his creatinine which was

elevated to 1.6 on admission was 1.1.  He did have a blood sugar up to 244,

probably due to the steroids which were started 20 mg daily, but improved down

to 107 fasting and 154 by the next afternoon.  He likely has some prediabetes at

this point with some stress-induced hyperglycemia.  It otherwise should be noted

that the patient did quit smoking during his stay.  He did not think he would

need anything to help with that as he had been not smoking for a week.  He does

have a history of lung cancer stage IA with surgery back in 2010 to the right

lower lobe.  The patient also has a history of bladder cancer both in remission.

He also has a history of prostate cancer.  He has a history of peripheral

vascular disease and again follows with IR.  He has hyperlipidemia and a remote

history reported of atrial flutter, but had no cardiac dysrhythmias during his

stay.

 

DISCHARGE PLANS AND INSTRUCTIONS:  He is going to follow up with Dr. Lionel Ocampo in the clinic on 02/06.  No lab work was ordered for his followup

appointment.  His lactic acid initially was 2.1, but did go down to 1.8 on

discharge.  His magnesium was also quite low at 1.3 and recommendations were

made for him to be on oral magnesium oxide 400 mg daily, so therefore I did

change my mind and we will repeat a magnesium, BMP at his followup with Dr. Lionel Ocampo.  He will be on prednisone 20 mg daily for 3 more days.

Antibiotics completed during his hospital stay.  He will follow up with

Interventional Radiology.  He may use some bacitracin ointment to his abdominal

wall.  He was also given an albuterol inhaler to use as needed for cough.  He

declined a nicotine patch.  He may consider other inhalers if needed if he were

to have frequent exacerbations.

 

PHYSICAL EXAMINATION:

Vital Signs:  Discharge vitals include a temperature of 97.7, pulse 95, blood

pressure 102/66, respiratory rate 20, O2 97% on room air.

General:  He is in no acute distress.

Cardiac:  His heart is regular rate and rhythm.  S1, S2 without murmur.

Respiratory:  Lungs sounds were decreased especially in the right base with some

inspiratory and expiratory wheezing appreciated over the left base.  No crackles

were noted today. Abdomen:  Had positive bowel sounds.  Soft and nontender.

Ventral hernia is noted.  There is just some slight redness, but no drainage or

warmth and is in a stitch present with no eschar noted.  I suspect that before

there was just a scab over the stitch.

Extremities:  Warm and dry.  No edema.  No calf tenderness.

Mental status:  Alert and orientated x3.

Psych:  He is not anxious or depressed.

 

Home Health addendum occurred and seen by myself face-to-face on 01/29/2018.

The patient requires home health for a new abdominal wall wound due to previous

hernia repair as well as a new admission for pneumonia and likely new diagnosis

of COPD for teaching and monitoring of his respiratory status and local wound

cares.  He is homebound due to his impaired endurance due to hypoxia from his

recent event of pneumonia.  I expect this to resolve in the next several weeks,

but until then he will be under Home Health assessments, periodically review

this plan of care.  Greater than 30 minutes spent on this discharge process.

 

 

MKA:  01/29/2018 16:25:54  MODL:  01/30/2018 13:42:28

Job #:  [id number]

## 2018-02-26 ENCOUNTER — HOSPITAL ENCOUNTER (EMERGENCY)
Dept: HOSPITAL 50 - VM.ED | Age: 83
Discharge: ANOTHER HOSPITAL | End: 2018-02-26
Payer: MEDICARE

## 2018-02-26 VITALS — DIASTOLIC BLOOD PRESSURE: 53 MMHG | SYSTOLIC BLOOD PRESSURE: 94 MMHG

## 2018-02-26 DIAGNOSIS — I10: ICD-10-CM

## 2018-02-26 DIAGNOSIS — Z79.82: ICD-10-CM

## 2018-02-26 DIAGNOSIS — Z79.899: ICD-10-CM

## 2018-02-26 DIAGNOSIS — F17.210: ICD-10-CM

## 2018-02-26 DIAGNOSIS — A41.9: Primary | ICD-10-CM

## 2018-02-26 DIAGNOSIS — E78.00: ICD-10-CM

## 2018-02-26 DIAGNOSIS — N17.9: ICD-10-CM

## 2018-02-26 DIAGNOSIS — R65.11: ICD-10-CM

## 2018-02-26 LAB
CHLORIDE SERPL-SCNC: 94 MMOL/L (ref 98–109)
SODIUM SERPL-SCNC: 136 MMOL/L (ref 138–146)

## 2018-02-26 PROCEDURE — 36415 COLL VENOUS BLD VENIPUNCTURE: CPT

## 2018-02-26 PROCEDURE — 99285 EMERGENCY DEPT VISIT HI MDM: CPT

## 2018-02-26 PROCEDURE — 74176 CT ABD & PELVIS W/O CONTRAST: CPT

## 2018-02-26 PROCEDURE — 85610 PROTHROMBIN TIME: CPT

## 2018-02-26 PROCEDURE — 84484 ASSAY OF TROPONIN QUANT: CPT

## 2018-02-26 PROCEDURE — 96376 TX/PRO/DX INJ SAME DRUG ADON: CPT

## 2018-02-26 PROCEDURE — 85025 COMPLETE CBC W/AUTO DIFF WBC: CPT

## 2018-02-26 PROCEDURE — 81001 URINALYSIS AUTO W/SCOPE: CPT

## 2018-02-26 PROCEDURE — 96365 THER/PROPH/DIAG IV INF INIT: CPT

## 2018-02-26 PROCEDURE — 96361 HYDRATE IV INFUSION ADD-ON: CPT

## 2018-02-26 PROCEDURE — 83605 ASSAY OF LACTIC ACID: CPT

## 2018-02-26 PROCEDURE — 80053 COMPREHEN METABOLIC PANEL: CPT

## 2018-02-26 PROCEDURE — 74019 RADEX ABDOMEN 2 VIEWS: CPT

## 2018-02-26 PROCEDURE — 96375 TX/PRO/DX INJ NEW DRUG ADDON: CPT

## 2018-02-26 NOTE — EDM.PDOC
ED HPI GENERAL MEDICAL PROBLEM





- General


Chief Complaint: Abdominal Pain


Stated Complaint: ER


Time Seen by Provider: 02/26/18 10:15


Source of Information: Reports: Patient ([id number])


History Limitations: Reports: No Limitations





- History of Present Illness


INITIAL COMMENTS - FREE TEXT/NARRATIVE: 





Pt. presents to ER with several day history of diffuse abdominal pain, chills, 

and difficulty voiding. He states that his last BM was approx. 5 days ago. Pt. 

has a history of a ventral hernia. He was seen by surgery last month and they 

opted for observation of the hernia, as the surgery will be very involved and 

last several hours.


Pt. was seen in the ER and admitted with community acquired pneumonia several 

weeks ago. At that time, he had an open area over the hernia. There was no 

abscess or fistulization noted to the area at that time.


Pt. denies any bloody stools, hematuria, dysuria, chest pain, or shortness of 

breath. He does complain of diffuse abdominal discomfort.


Location: Reports: Abdomen


Quality: Reports: Ache


Severity: Severe


Improves with: Reports: Rest


Worsens with: Reports: Movement


  ** Middle Abdomen


Pain Score (Numeric/FACES): 5





- Related Data


 Allergies











Allergy/AdvReac Type Severity Reaction Status Date / Time


 


No Known Allergies Allergy   Verified 01/24/18 15:20











Home Meds: 


 Home Meds





Ammonium Lactate [Lac-Hydrin 12% Crm] 1 applic TOP QID PRN 12/14/15 [History]


Triamcinolone Acetonide [Kenalog 0.1% Crm] 1 applic TOP BID 12/14/15 [History]


Triamterene/Hydrochlorothiazid [Triamterene-HCTZ 37.5-25 MG] 1 cap PO DAILY 12/

14/15 [History]


amLODIPine [Norvasc] 10 mg PO BEDTIME 12/14/15 [History]


Aspirin [Halfprin] 81 mg PO DAILY  tab.ec 12/17/15 [Rx]


Metoprolol Tartrate [Lopressor] 100 mg PO DAILY  tablet 12/17/15 [Rx]


Pantoprazole [ProTONIX***] 40 mg PO DAILY@0700  tab.cr 12/17/15 [Rx]


Tolterodine [Detrol LA 24 Hr] 4 mg PO BEDTIME #30 cap.er 12/17/15 [Rx]


Captopril [Capoten] 25 mg PO BIDMEALS 06/21/16 [History]


atorvaSTATin [Lipitor] 40 mg PO BEDTIME 06/21/16 [History]


DULoxetine [Cymbalta] 20 mg PO DAILY 01/24/18 [History]


Meloxicam [Mobic] 7.5 mg PO DAILY 01/24/18 [History]


Polyethylene Glycol 3350 [MiraLAX] 17 gm PO DAILY PRN 01/24/18 [History]


traMADol [Ultram] 50 - 100 mg PO Q6H PRN 01/24/18 [History]


Albuterol Sulfate [Proair Hfa] 8.5 gm IH Q4H PRN #1 hfa.aer.ad 01/29/18 [Rx]


Bacitracin [Bacitracin Oint] 1 dose TOP BID #1 tube 01/29/18 [Rx]


Prednisone [IJD: predniSONE] 20 mg PO WITHBREAKFAST #3 tablet 01/29/18 [Rx]











Past Medical History


HEENT History: Reports: Cataract, Glaucoma, Impaired Vision


Other HEENT History: myopia


Cardiovascular History: Reports: Aneurysm, High Cholesterol, Hypertension, PVD


Other Cardiovascular History: arthrosclerosis, aflutter


Respiratory History: Reports: SOB


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


Gastrointestinal History: Reports: None


Other Gastrointestinal History: abdominal pain


Genitourinary History: Reports: Other (See Below)


Other Genitourinary History: urine cytology abnormalm, bladder tumor, hematuria

, neoplasm of bladder and prostate


Musculoskeletal History: Reports: Back Pain, Chronic, Osteoarthritis


Neurological History: Reports: None


Endocrine/Metabolic History: Reports: None


Dermatologic History: Reports: Other (See Below)


Other Dermatologic History: tinea corporis





- Infectious Disease History


Infectious Disease History: Reports: Chicken Pox, Measles, Mumps





- Past Surgical History


HEENT Surgical History: Reports: Cataract Surgery


GI Surgical History: Reports: Hernia, Abdominal, Hernia Repair/Other


Musculoskeletal Surgical History: Reports: Hip Replacement





Social & Family History





- Family History


Family Medical History: Noncontributory


Cardiac: Reports: MI


Neurological: Reports: Alzheimers Disease





- Tobacco Use


Smoking Status *Q: Current Every Day Smoker


Years of Tobacco use: 68


Packs/Tins Daily: 0.5


Second Hand Smoke Exposure: No





- Alcohol Use


Days Per Week of Alcohol Use: 4


Number of Drinks Per Day: 2


Total Drinks Per Week: 8





- Recreational Drug Use


Recreational Drug Use: No





ED ROS GENERAL





- Review of Systems


Review Of Systems: See Below


Constitutional: Reports: Chills, Fatigue, Decreased Appetite


HEENT: Reports: No Symptoms


Respiratory: Reports: No Symptoms


Cardiovascular: Reports: No Symptoms


Endocrine: Reports: No Symptoms


GI/Abdominal: Reports: Abdominal Pain, Distension, Nausea


: Reports: No Symptoms


Musculoskeletal: Reports: No Symptoms


Skin: Reports: No Symptoms


Neurological: Reports: No Symptoms


Psychiatric: Reports: No Symptoms


Hematologic/Lymphatic: Reports: No Symptoms


Immunologic: Reports: No Symptoms





ED EXAM, GI/ABD





- Physical Exam


Exam: See Below


Exam Limited By: No Limitations


General Appearance: Alert, WD/WN, No Apparent Distress


Eyes: Bilateral: Normal Appearance, EOMI


Throat/Mouth: Normal Inspection, Normal Lips, Normal Teeth, Normal Gums, Normal 

Oropharynx, Normal Voice, No Airway Compromise


Head: Atraumatic, Normocephalic


Neck: Normal Inspection, Supple, Non-Tender, Full Range of Motion


Respiratory/Chest: No Respiratory Distress, Lungs Clear, Normal Breath Sounds, 

No Accessory Muscle Use, Chest Non-Tender


Cardiovascular: Normal Peripheral Pulses, Regular Rate, Rhythm, No Edema, No 

Gallop, No JVD, No Murmur, No Rub


GI/Abdominal Exam: Soft, Distended, Abnormal Bowel Sounds


 (Male) Exam: Deferred


Rectal (Males) Exam: Deferred


Back Exam: Normal Inspection, Full Range of Motion, NT


Extremities: Normal Inspection, Normal Range of Motion, Non-Tender, Normal 

Capillary Refill, No Pedal Edema


Neurological: Alert, Oriented, CN II-XII Intact, Normal Cognition, Normal Gait, 

Normal Reflexes, No Motor/Sensory Deficits


Psychiatric: Normal Affect, Normal Mood


Skin Exam: Warm, Dry, Intact, Normal Color, No Rash





Course





- Vital Signs


Last Recorded V/S: 


 Last Vital Signs











Temp  36.5 C   02/26/18 10:15


 


Pulse  94   02/26/18 10:15


 


Resp  18   02/26/18 10:15


 


BP  127/64   02/26/18 10:15


 


Pulse Ox      














- Orders/Labs/Meds


Orders: 


 Active Orders 24 hr











 Category Date Time Status


 


 Insert Rabago Catheter [Insert Urinary Catheter] [OM.PC] Care  02/26/18 14:00 

Ordered





 Q24H   


 


 Urinary Catheter Assessment [RC] ASDIRECTED Care  02/26/18 13:58 Active


 


 Abdomen 2V AP Flat Upright [CR] Stat Exams  02/26/18 10:31 Taken


 


 UA W/MICROSCOPIC [URIN] Stat Lab  02/26/18 10:31 Ordered


 


 Sodium Chloride 0.9% [Normal Saline] 1,000 ml Med  02/26/18 12:45 Active





 IV .BOLUS   


 


 Sodium Chloride 0.9% [Saline Flush] Med  02/26/18 10:31 Active





 10 ml FLUSH ASDIRECTED PRN   


 


 Sodium Chloride 0.9% [Saline Flush] Med  02/26/18 13:55 Active





 10 ml FLUSH ASDIRECTED PRN   


 


 Peripheral IV Insertion Adult [OM.PC] Routine Oth  02/26/18 10:32 Ordered


 


 Peripheral IV Insertion Adult [OM.PC] Routine Oth  02/26/18 13:55 Ordered








 Medication Orders





Sodium Chloride (Normal Saline)  1,000 mls @ 500 mls/hr IV .BOLUS ONE


   Stop: 02/26/18 14:44


   Last Admin: 02/26/18 12:59  Dose: 500 mls/hr


Sodium Chloride (Saline Flush)  10 ml FLUSH ASDIRECTED PRN


   PRN Reason: Keep Vein Open


Sodium Chloride (Saline Flush)  10 ml FLUSH ASDIRECTED PRN


   PRN Reason: Keep Vein Open








Labs: 


 Laboratory Tests











  02/26/18 02/26/18 02/26/18 Range/Units





  10:54 10:54 10:54 


 


WBC  19.3 H    (4.0-10.0)  x10^3/uL


 


RBC  4.47 L    (4.5-6.0)  x10^6/uL


 


Hgb  14.8    (14.0-18.0)  g/dL


 


Hct  44.4    (40.0-52.0)  %


 


MCV  99.3 H    (78.0-93.0)  fL


 


MCH  33.1 H    (26.0-32.0)  pg


 


MCHC  33.3    (32.0-36.0)  g/dL


 


RDW Coeff of Sudhir  14.6    (10.0-15.0)  %


 


Plt Count  278  D    (130-400)  x10^3/uL


 


Add Manual Diff  Yes    


 


Neutrophils % (Manual)  40 L    (50-80)  %


 


Band Neutrophils %  50 H    (0-6)  %


 


Lymphocytes % (Manual)  7 L    (25-50)  %


 


Monocytes % (Manual)  1 L    (2-11)  %


 


Basophils % (Manual)  1    (0-1)  %


 


Metamyelocytes %  1 H    (0)  %


 


Platelet Estimate  Adequate    


 


PT     (9.8-11.8)  SEC


 


INR     (2.0-3.5)  


 


POC Sodium     (138-146)  mmol/L


 


Sodium   136 L   (138-146)  mmol/L


 


POC Potassium     (3.5-4.9)  mmol/L


 


Potassium   3.9   (3.5-4.9)  mmol/L


 


POC Chloride     ()  mmol/L


 


Chloride   94 L   ()  mmol/L


 


Carbon Dioxide   26   (24-29)  mmol/L


 


POC Total CO2     (24-29)  mmol/L


 


POC BUN     (8-26)  mg/dL


 


BUN   31 H   (8-26)  mg/dL


 


Creatinine   2.3 H   (0.6-1.3)  mg/dL


 


POC Creatinine     (0.6-1.3)  mg/dL


 


Est Cr Clr Drug Dosing   25.74   mL/min


 


Estimated GFR (MDRD)   27   


 


Glucose   135 H   ()  mg/dL


 


POC Glucose     ()  mg/dL


 


Lactic Acid    7.4 H*  (0.4-2.0)  mmol/L


 


Calcium   9.1   (8.5-10.1)  mg/dL


 


Corrected Calcium   9.74   (8.5-10.1)  mg/dL


 


Total Bilirubin   1.0   (0.2-1.0)  mg/dL


 


AST   28   (15-37)  U/L


 


ALT   18   (16-63)  U/L


 


Alkaline Phosphatase   167 H   ()  U/L


 


POC Troponin I     (0.00-0.08)  ng/mL


 


Troponin I   Cancelled   


 


Total Protein   7.8   (6.4-8.2)  g/dL


 


Albumin   3.2 L   (3.4-5.0)  g/dL


 


Globulin   4.6   


 


Albumin/Globulin Ratio   0.70   














  02/26/18 02/26/18 02/26/18 Range/Units





  10:54 11:02 11:13 


 


WBC     (4.0-10.0)  x10^3/uL


 


RBC     (4.5-6.0)  x10^6/uL


 


Hgb     (14.0-18.0)  g/dL


 


Hct     (40.0-52.0)  %


 


MCV     (78.0-93.0)  fL


 


MCH     (26.0-32.0)  pg


 


MCHC     (32.0-36.0)  g/dL


 


RDW Coeff of Sudhir     (10.0-15.0)  %


 


Plt Count     (130-400)  x10^3/uL


 


Add Manual Diff     


 


Neutrophils % (Manual)     (50-80)  %


 


Band Neutrophils %     (0-6)  %


 


Lymphocytes % (Manual)     (25-50)  %


 


Monocytes % (Manual)     (2-11)  %


 


Basophils % (Manual)     (0-1)  %


 


Metamyelocytes %     (0)  %


 


Platelet Estimate     


 


PT     (9.8-11.8)  SEC


 


INR     (2.0-3.5)  


 


POC Sodium   136 L   (138-146)  mmol/L


 


Sodium     (138-146)  mmol/L


 


POC Potassium   3.9   (3.5-4.9)  mmol/L


 


Potassium     (3.5-4.9)  mmol/L


 


POC Chloride   94 L   ()  mmol/L


 


Chloride     ()  mmol/L


 


Carbon Dioxide     (24-29)  mmol/L


 


POC Total CO2   26   (24-29)  mmol/L


 


POC BUN   31 H   (8-26)  mg/dL


 


BUN     (8-26)  mg/dL


 


Creatinine     (0.6-1.3)  mg/dL


 


POC Creatinine   2.3 H   (0.6-1.3)  mg/dL


 


Est Cr Clr Drug Dosing     mL/min


 


Estimated GFR (MDRD)     


 


Glucose     ()  mg/dL


 


POC Glucose   135 H   ()  mg/dL


 


Lactic Acid     (0.4-2.0)  mmol/L


 


Calcium     (8.5-10.1)  mg/dL


 


Corrected Calcium     (8.5-10.1)  mg/dL


 


Total Bilirubin     (0.2-1.0)  mg/dL


 


AST     (15-37)  U/L


 


ALT     (16-63)  U/L


 


Alkaline Phosphatase     ()  U/L


 


POC Troponin I  0.00   0.00  (0.00-0.08)  ng/mL


 


Troponin I     


 


Total Protein     (6.4-8.2)  g/dL


 


Albumin     (3.4-5.0)  g/dL


 


Globulin     


 


Albumin/Globulin Ratio     














  02/26/18 Range/Units





  11:14 


 


WBC   (4.0-10.0)  x10^3/uL


 


RBC   (4.5-6.0)  x10^6/uL


 


Hgb   (14.0-18.0)  g/dL


 


Hct   (40.0-52.0)  %


 


MCV   (78.0-93.0)  fL


 


MCH   (26.0-32.0)  pg


 


MCHC   (32.0-36.0)  g/dL


 


RDW Coeff of Sudhir   (10.0-15.0)  %


 


Plt Count   (130-400)  x10^3/uL


 


Add Manual Diff   


 


Neutrophils % (Manual)   (50-80)  %


 


Band Neutrophils %   (0-6)  %


 


Lymphocytes % (Manual)   (25-50)  %


 


Monocytes % (Manual)   (2-11)  %


 


Basophils % (Manual)   (0-1)  %


 


Metamyelocytes %   (0)  %


 


Platelet Estimate   


 


PT  10.1  (9.8-11.8)  SEC


 


INR  0.9 L  (2.0-3.5)  


 


POC Sodium   (138-146)  mmol/L


 


Sodium   (138-146)  mmol/L


 


POC Potassium   (3.5-4.9)  mmol/L


 


Potassium   (3.5-4.9)  mmol/L


 


POC Chloride   ()  mmol/L


 


Chloride   ()  mmol/L


 


Carbon Dioxide   (24-29)  mmol/L


 


POC Total CO2   (24-29)  mmol/L


 


POC BUN   (8-26)  mg/dL


 


BUN   (8-26)  mg/dL


 


Creatinine   (0.6-1.3)  mg/dL


 


POC Creatinine   (0.6-1.3)  mg/dL


 


Est Cr Clr Drug Dosing   mL/min


 


Estimated GFR (MDRD)   


 


Glucose   ()  mg/dL


 


POC Glucose   ()  mg/dL


 


Lactic Acid   (0.4-2.0)  mmol/L


 


Calcium   (8.5-10.1)  mg/dL


 


Corrected Calcium   (8.5-10.1)  mg/dL


 


Total Bilirubin   (0.2-1.0)  mg/dL


 


AST   (15-37)  U/L


 


ALT   (16-63)  U/L


 


Alkaline Phosphatase   ()  U/L


 


POC Troponin I   (0.00-0.08)  ng/mL


 


Troponin I   


 


Total Protein   (6.4-8.2)  g/dL


 


Albumin   (3.4-5.0)  g/dL


 


Globulin   


 


Albumin/Globulin Ratio   











Meds: 


Medications











Generic Name Dose Route Start Last Admin





  Trade Name Freq  PRN Reason Stop Dose Admin


 


Sodium Chloride  1,000 mls @ 500 mls/hr  02/26/18 12:45  02/26/18 12:59





  Normal Saline  IV  02/26/18 14:44  500 mls/hr





  .BOLUS ONE   Administration


 


Sodium Chloride  10 ml  02/26/18 10:31  





  Saline Flush  FLUSH   





  ASDIRECTED PRN   





  Keep Vein Open   


 


Sodium Chloride  10 ml  02/26/18 13:55  





  Saline Flush  FLUSH   





  ASDIRECTED PRN   





  Keep Vein Open   














Discontinued Medications














Generic Name Dose Route Start Last Admin





  Trade Name Freq  PRN Reason Stop Dose Admin


 


Piperacillin Sod/Tazobactam  100 mls @ 200 mls/hr  02/26/18 12:53  02/26/18 13:

18





  Sod 2.25 gm/ Sodium Chloride  IV  02/26/18 13:22  200 mls/hr





  ONETIME ONE   Administration


 


Morphine Sulfate  4 mg  02/26/18 10:32  02/26/18 10:58





  Morphine  IVPUSH  02/26/18 10:33  4 mg





  ONETIME ONE   Administration


 


Morphine Sulfate  4 mg  02/26/18 12:55  02/26/18 13:02





  Morphine  IVPUSH  02/26/18 12:56  4 mg





  ONETIME ONE   Administration


 


Ondansetron HCl  4 mg  02/26/18 10:32  02/26/18 10:56





  Zofran  IVPUSH  02/26/18 10:33  4 mg





  ONETIME ONE   Administration














- Re-Assessments/Exams


Free Text/Narrative Re-Assessment/Exam: 





02/26/18 14:23


Pt. has been given IV morphine with improvement in his discomfort. Was started 

on Zosyn. Remains hemodynamically stable. 





Departure





- Departure


Time of Disposition: 14:24


Disposition: DC/Tfer to Acute Hospital [facility]


Clinical Impression: 


 Acute kidney injury, Sepsis, Abdominal pain








- Discharge Information


Referrals: 


Lionel Ocampo MD [Primary Care Provider] - 


Forms:  ED Department Discharge





- My Orders


Last 24 Hours: 


My Active Orders





02/26/18 10:31


Abdomen 2V AP Flat Upright [CR] Stat 


UA W/MICROSCOPIC [URIN] Stat 


Sodium Chloride 0.9% [Saline Flush]   10 ml FLUSH ASDIRECTED PRN 





02/26/18 10:32


Peripheral IV Insertion Adult [OM.PC] Routine 





02/26/18 12:45


Sodium Chloride 0.9% [Normal Saline] 1,000 ml IV .BOLUS 





02/26/18 13:55


Sodium Chloride 0.9% [Saline Flush]   10 ml FLUSH ASDIRECTED PRN 


Peripheral IV Insertion Adult [OM.PC] Routine 





02/26/18 13:58


Urinary Catheter Assessment [RC] ASDIRECTED 





02/26/18 14:00


Insert Rabago Catheter [Insert Urinary Catheter] [OM.PC] Q24H 














- Assessment/Plan


Last 24 Hours: 


My Active Orders





02/26/18 10:31


Abdomen 2V AP Flat Upright [CR] Stat 


UA W/MICROSCOPIC [URIN] Stat 


Sodium Chloride 0.9% [Saline Flush]   10 ml FLUSH ASDIRECTED PRN 





02/26/18 10:32


Peripheral IV Insertion Adult [OM.PC] Routine 





02/26/18 12:45


Sodium Chloride 0.9% [Normal Saline] 1,000 ml IV .BOLUS 





02/26/18 13:55


Sodium Chloride 0.9% [Saline Flush]   10 ml FLUSH ASDIRECTED PRN 


Peripheral IV Insertion Adult [OM.PC] Routine 





02/26/18 13:58


Urinary Catheter Assessment [RC] ASDIRECTED 





02/26/18 14:00


Insert Rabago Catheter [Insert Urinary Catheter] [OM.PC] Q24H 











Assessment:: 





Sepsis


JEMIMA


Abdominal pain; Incarcerated hernia vs. ischemic gut.


Plan: 





IV NS at 250ml/hr.


Start Zosyn 2.25gm IV


Rabago Catheter


Awaiting call from hospitalist at [facility] for report, but Dr. Ahmadi will 

accept the pt. in transfer.


Pt. will be transported via [facility] ground ambulance.